# Patient Record
Sex: MALE | Race: WHITE | NOT HISPANIC OR LATINO | URBAN - METROPOLITAN AREA
[De-identification: names, ages, dates, MRNs, and addresses within clinical notes are randomized per-mention and may not be internally consistent; named-entity substitution may affect disease eponyms.]

---

## 2023-11-20 ENCOUNTER — ANCILLARY PROCEDURE (OUTPATIENT)
Dept: RADIOLOGY | Facility: CLINIC | Age: 63
End: 2023-11-20
Payer: MEDICARE

## 2023-11-20 DIAGNOSIS — M47.22 OTHER SPONDYLOSIS WITH RADICULOPATHY, CERVICAL REGION: ICD-10-CM

## 2023-11-20 DIAGNOSIS — M50.220 OTHER CERVICAL DISC DISPLACEMENT, MID-CERVICAL REGION, UNSPECIFIED LEVEL: ICD-10-CM

## 2023-11-20 DIAGNOSIS — M50.320 OTHER CERVICAL DISC DEGENERATION, MID-CERVICAL REGION, UNSPECIFIED LEVEL: ICD-10-CM

## 2023-11-20 PROCEDURE — 72141 MRI NECK SPINE W/O DYE: CPT

## 2024-08-21 ENCOUNTER — HOSPITAL ENCOUNTER (OUTPATIENT)
Dept: RADIOLOGY | Facility: CLINIC | Age: 64
Discharge: HOME | End: 2024-08-21
Payer: MEDICARE

## 2024-08-21 DIAGNOSIS — M75.122 COMPLETE ROTATOR CUFF TEAR OR RUPTURE OF LEFT SHOULDER, NOT SPECIFIED AS TRAUMATIC: ICD-10-CM

## 2024-08-21 PROCEDURE — 73221 MRI JOINT UPR EXTREM W/O DYE: CPT | Mod: LEFT SIDE | Performed by: RADIOLOGY

## 2024-08-21 PROCEDURE — 73221 MRI JOINT UPR EXTREM W/O DYE: CPT | Mod: LT

## 2024-08-28 ENCOUNTER — PRE-ADMISSION TESTING (OUTPATIENT)
Dept: PREADMISSION TESTING | Facility: HOSPITAL | Age: 64
End: 2024-08-28
Payer: MEDICARE

## 2024-08-28 ENCOUNTER — LAB (OUTPATIENT)
Dept: LAB | Facility: LAB | Age: 64
End: 2024-08-28
Payer: MEDICARE

## 2024-08-28 VITALS
WEIGHT: 169.3 LBS | DIASTOLIC BLOOD PRESSURE: 79 MMHG | OXYGEN SATURATION: 98 % | HEART RATE: 89 BPM | TEMPERATURE: 98.1 F | BODY MASS INDEX: 23.7 KG/M2 | RESPIRATION RATE: 18 BRPM | SYSTOLIC BLOOD PRESSURE: 139 MMHG | HEIGHT: 71 IN

## 2024-08-28 DIAGNOSIS — Z01.818 PRE-OP EVALUATION: ICD-10-CM

## 2024-08-28 DIAGNOSIS — Z01.818 PRE-OP EVALUATION: Primary | ICD-10-CM

## 2024-08-28 LAB
ANION GAP SERPL CALC-SCNC: 15 MMOL/L
BASOPHILS # BLD AUTO: 0.05 X10*3/UL (ref 0–0.1)
BASOPHILS NFR BLD AUTO: 0.4 %
BUN SERPL-MCNC: 16 MG/DL (ref 8–25)
CALCIUM SERPL-MCNC: 10 MG/DL (ref 8.5–10.4)
CHLORIDE SERPL-SCNC: 101 MMOL/L (ref 97–107)
CO2 SERPL-SCNC: 25 MMOL/L (ref 24–31)
CREAT SERPL-MCNC: 1.1 MG/DL (ref 0.4–1.6)
EGFRCR SERPLBLD CKD-EPI 2021: 74 ML/MIN/1.73M*2
EOSINOPHIL # BLD AUTO: 0.1 X10*3/UL (ref 0–0.7)
EOSINOPHIL NFR BLD AUTO: 0.8 %
ERYTHROCYTE [DISTWIDTH] IN BLOOD BY AUTOMATED COUNT: 13.1 % (ref 11.5–14.5)
GLUCOSE SERPL-MCNC: 90 MG/DL (ref 65–99)
HCT VFR BLD AUTO: 49.1 % (ref 41–52)
HGB BLD-MCNC: 16.7 G/DL (ref 13.5–17.5)
IMM GRANULOCYTES # BLD AUTO: 0.08 X10*3/UL (ref 0–0.7)
IMM GRANULOCYTES NFR BLD AUTO: 0.6 % (ref 0–0.9)
LYMPHOCYTES # BLD AUTO: 2.19 X10*3/UL (ref 1.2–4.8)
LYMPHOCYTES NFR BLD AUTO: 17.1 %
MCH RBC QN AUTO: 32.1 PG (ref 26–34)
MCHC RBC AUTO-ENTMCNC: 34 G/DL (ref 32–36)
MCV RBC AUTO: 94 FL (ref 80–100)
MONOCYTES # BLD AUTO: 1.64 X10*3/UL (ref 0.1–1)
MONOCYTES NFR BLD AUTO: 12.8 %
NEUTROPHILS # BLD AUTO: 8.76 X10*3/UL (ref 1.2–7.7)
NEUTROPHILS NFR BLD AUTO: 68.3 %
NRBC BLD-RTO: 0 /100 WBCS (ref 0–0)
PLATELET # BLD AUTO: 305 X10*3/UL (ref 150–450)
POTASSIUM SERPL-SCNC: 4.5 MMOL/L (ref 3.4–5.1)
RBC # BLD AUTO: 5.21 X10*6/UL (ref 4.5–5.9)
SODIUM SERPL-SCNC: 141 MMOL/L (ref 133–145)
WBC # BLD AUTO: 12.8 X10*3/UL (ref 4.4–11.3)

## 2024-08-28 PROCEDURE — 80048 BASIC METABOLIC PNL TOTAL CA: CPT

## 2024-08-28 PROCEDURE — 99203 OFFICE O/P NEW LOW 30 MIN: CPT

## 2024-08-28 PROCEDURE — 87081 CULTURE SCREEN ONLY: CPT | Mod: WESLAB

## 2024-08-28 PROCEDURE — 85025 COMPLETE CBC W/AUTO DIFF WBC: CPT

## 2024-08-28 RX ORDER — FLUTICASONE PROPIONATE 50 MCG
1 SPRAY, SUSPENSION (ML) NASAL DAILY
COMMUNITY
Start: 2023-01-03

## 2024-08-28 RX ORDER — IBUPROFEN 800 MG/1
800 TABLET ORAL EVERY 6 HOURS PRN
COMMUNITY
End: 2024-09-04 | Stop reason: HOSPADM

## 2024-08-28 RX ORDER — CHLORHEXIDINE GLUCONATE ORAL RINSE 1.2 MG/ML
SOLUTION DENTAL
Qty: 473 ML | Refills: 0 | Status: SHIPPED | OUTPATIENT
Start: 2024-08-28 | End: 2024-09-04

## 2024-08-28 RX ORDER — HYDROCODONE BITARTRATE AND ACETAMINOPHEN 5; 325 MG/1; MG/1
1 TABLET ORAL EVERY 6 HOURS PRN
COMMUNITY
Start: 2022-12-27 | End: 2024-09-04 | Stop reason: HOSPADM

## 2024-08-28 RX ORDER — ACETAMINOPHEN, DIPHENHYDRAMINE HCL, PHENYLEPHRINE HCL 325; 25; 5 MG/1; MG/1; MG/1
TABLET ORAL NIGHTLY PRN
COMMUNITY

## 2024-08-28 ASSESSMENT — DUKE ACTIVITY SCORE INDEX (DASI)
CAN YOU WALK INDOORS, SUCH AS AROUND YOUR HOUSE: YES
CAN YOU HAVE SEXUAL RELATIONS: YES
DASI METS SCORE: 7.3
CAN YOU WALK A BLOCK OR TWO ON LEVEL GROUND: YES
CAN YOU CLIMB A FLIGHT OF STAIRS OR WALK UP A HILL: YES
CAN YOU DO LIGHT WORK AROUND THE HOUSE LIKE DUSTING OR WASHING DISHES: YES
CAN YOU TAKE CARE OF YOURSELF (EAT, DRESS, BATHE, OR USE TOILET): YES
CAN YOU PARTICIPATE IN MODERATE RECREATIONAL ACTIVITIES LIKE GOLF, BOWLING, DANCING, DOUBLES TENNIS OR THROWING A BASEBALL OR FOOTBALL: NO
CAN YOU RUN A SHORT DISTANCE: YES
CAN YOU PARTICIPATE IN STRENOUS SPORTS LIKE SWIMMING, SINGLES TENNIS, FOOTBALL, BASKETBALL, OR SKIING: NO
CAN YOU DO YARD WORK LIKE RAKING LEAVES, WEEDING OR PUSHING A MOWER: YES
CAN YOU DO HEAVY WORK AROUND THE HOUSE LIKE SCRUBBING FLOORS OR LIFTING AND MOVING HEAVY FURNITURE: NO
CAN YOU DO MODERATE WORK AROUND THE HOUSE LIKE VACUUMING, SWEEPING FLOORS OR CARRYING GROCERIES: YES
TOTAL_SCORE: 36.7

## 2024-08-28 ASSESSMENT — ENCOUNTER SYMPTOMS
ALLERGIC/IMMUNOLOGIC NEGATIVE: 1
CARDIOVASCULAR NEGATIVE: 1
HEMATOLOGIC/LYMPHATIC NEGATIVE: 1
PSYCHIATRIC NEGATIVE: 1
RESPIRATORY NEGATIVE: 1
ENDOCRINE NEGATIVE: 1
CONSTITUTIONAL NEGATIVE: 1
GASTROINTESTINAL NEGATIVE: 1
NUMBNESS: 1
EYES NEGATIVE: 1

## 2024-08-28 ASSESSMENT — PAIN DESCRIPTION - DESCRIPTORS: DESCRIPTORS: ACHING;SHARP

## 2024-08-28 ASSESSMENT — PAIN - FUNCTIONAL ASSESSMENT: PAIN_FUNCTIONAL_ASSESSMENT: 0-10

## 2024-08-28 ASSESSMENT — PAIN SCALES - GENERAL: PAINLEVEL_OUTOF10: 5 - MODERATE PAIN

## 2024-08-28 NOTE — H&P (VIEW-ONLY)
CPM/PAT Evaluation       Name: Brady Mullins (Brady Mullins)  /Age: 1960/64 y.o.     In-Person       Chief Complaint: Left shoulder pain      HPI: Brady Mullins is a 64 year old patient with a history of BPH, hypertension, kidney stones , and hyperlipidemia. He has complaints of left shoulder pain. He states the pain started about two weeks ago after he was chopping wood. He states he woke up the next day and could not lift his lift arm above his shoulder. He states he can lift it above the shoulder today but not without pain. He admits to numbness and tingling that radiates down the left arm. He manages pain at home with ibuprofen. MRI of the left shoulder 24 shows:     IMPRESSION:  1. Full-thickness supraspinatus tendon tear anterior to posterior  tendon with AP gap of 2.1 cm. However, the supraspinatus tendon  demonstrates chronic attenuated appearance. Supraspinatus muscle  demonstrates mild atrophy and feathery appearing interstitial muscle  edema      2. Moderate subscapularis tendinosis      3. Mild long head biceps tendinosis.    He denies fever, chills, nausea, vomiting, chest pain, sob, dizziness, or palpitations. He is scheduled for an arthroscopy left rotator cuff repair.    Past Medical History:   Diagnosis Date    BPH (benign prostatic hyperplasia)     Hyperlipidemia     Hypertension     Kidney stones        Past Surgical History:   Procedure Laterality Date    EXTRACORPOREAL SHOCK WAVE LITHOTRIPSY      MR HEAD ANGIO WO IV CONTRAST  2013    MR HEAD ANGIO WO IV CONTRAST [facility]       Social History     Tobacco Use    Smoking status: Every Day     Current packs/day: 0.50     Average packs/day: 0.5 packs/day for 45.0 years (22.5 ttl pk-yrs)     Types: Cigarettes     Start date: 1979    Smokeless tobacco: Never   Substance Use Topics    Alcohol use: Yes     Comment: OCCASIONALLY     Social History     Substance and Sexual Activity   Drug Use Not Currently          No family history on file.    Allergies   Allergen Reactions    Codeine GI Upset and Nausea And Vomiting     Current Outpatient Medications   Medication Sig Dispense Refill    fluticasone (Flonase Allergy Relief) 50 mcg/actuation nasal spray Administer 1 spray into each nostril once daily.      HYDROcodone-acetaminophen (Norco) 5-325 mg tablet 1 tablet every 6 hours if needed for moderate pain (4 - 6).      ibuprofen 800 mg tablet Take 1 tablet (800 mg) by mouth every 6 hours if needed.      chlorhexidine (Peridex) 0.12 % solution Use as directed 473 mL 0    melatonin 10 mg tablet Take by mouth as needed at bedtime.       No current facility-administered medications for this visit.       Review of Systems   Constitutional: Negative.    HENT: Negative.     Eyes: Negative.    Respiratory: Negative.     Cardiovascular: Negative.    Gastrointestinal: Negative.    Endocrine: Negative.    Genitourinary: Negative.    Musculoskeletal:         Left shoulder pain     Skin: Negative.    Allergic/Immunologic: Negative.    Neurological:  Positive for numbness (tingling down left arm).   Hematological: Negative.    Psychiatric/Behavioral: Negative.                Physical Exam  Vitals reviewed.   Constitutional:       Appearance: Normal appearance.   HENT:      Head: Normocephalic and atraumatic.      Nose: Nose normal.      Mouth/Throat:      Mouth: Mucous membranes are moist.      Pharynx: Oropharynx is clear.   Eyes:      Extraocular Movements: Extraocular movements intact.      Conjunctiva/sclera: Conjunctivae normal.      Pupils: Pupils are equal, round, and reactive to light.   Cardiovascular:      Rate and Rhythm: Normal rate and regular rhythm.      Pulses: Normal pulses.      Heart sounds: Normal heart sounds.   Pulmonary:      Effort: Pulmonary effort is normal.      Breath sounds: Normal breath sounds.   Abdominal:      General: Bowel sounds are normal.      Palpations: Abdomen is soft.   Genitourinary:      "Comments: Assessment referred to physician    Musculoskeletal:      Cervical back: Normal range of motion.      Comments: Pain with left shoulder ROM   Skin:     General: Skin is warm and dry.   Neurological:      General: No focal deficit present.      Mental Status: He is alert and oriented to person, place, and time.   Psychiatric:         Mood and Affect: Mood normal.         Behavior: Behavior normal.         Thought Content: Thought content normal.         Judgment: Judgment normal.          PAT AIRWAY:   Airway:     Mallampati::  III    TM distance::  >3 FB    Neck ROM::  Full  normal      /79   Pulse 89   Temp 36.7 °C (98.1 °F) (Temporal)   Resp 18   Ht 1.803 m (5' 10.98\")   Wt 76.8 kg (169 lb 4.8 oz)   SpO2 98%   BMI 23.62 kg/m²     ASA:2  GLO: 2.8%  RCRI: 0.4%      DASI Risk Score      Flowsheet Row Most Recent Value   DASI SCORE 36.7   METS Score (Will be calculated only when all the questions are answered) 7.3          Caprini DVT Assessment    No data to display       Modified Frailty Index    No data to display       CHADS2 Stroke Risk  Current as of about an hour ago        N/A 3 to 100%: High Risk   2 to < 3%: Medium Risk   0 to < 2%: Low Risk     Last Change: N/A          This score determines the patient's risk of having a stroke if the patient has atrial fibrillation.        This score is not applicable to this patient. Components are not calculated.          Revised Cardiac Risk Index    No data to display       Apfel Simplified Score    No data to display       Risk Analysis Index Results This Encounter    No data found in the last 1 encounters.       Stop Bang Score      Flowsheet Row Most Recent Value   Do you snore loudly? 0   Do you often feel tired or fatigued after your sleep? 0   Has anyone ever observed you stop breathing in your sleep? 0   Do you have or are you being treated for high blood pressure? 1   Recent BMI (Calculated) 23.6   Is BMI greater than 35 kg/m2? 0=No "   Age older than 50 years old? 1=Yes   Is your neck circumference greater than 17 inches (Male) or 16 inches (Female)? 0   Gender - Male 1=Yes   STOP-BANG Total Score 3            Assessment and Plan:     Nontraumatic complete tear of rotator cuff, left : Repair Arthroscopy Rotator Cuff Shoulder Left  BPH: Last PSA 5/9/24 2.91  Hyperlipidemia  Hypertension: Patient states he was taking lisinopril but stopped taking medication after he lost 25 pounds. BP today in /79  5. Daily cigarette smoker: half a pack a day    LABS: CBC, BMP,MRSA collected in PAT    Sharmin Gary APRN-CNP

## 2024-08-28 NOTE — PREPROCEDURE INSTRUCTIONS
Medication List            Accurate as of August 28, 2024  2:44 PM. Always use your most recent med list.                Flonase Allergy Relief 50 mcg/actuation nasal spray  Generic drug: fluticasone  Medication Adjustments for Surgery: Other (Comment)  Notes to patient: CAN USE THE MORNING OF SURGERY IF NEEDED     HYDROcodone-acetaminophen 5-325 mg tablet  Commonly known as: Norco  Medication Adjustments for Surgery: Other (Comment)  Notes to patient: CAN TAKE THE MORNING OF SURGERY IF NEEDED     ibuprofen 800 mg tablet  Medication Adjustments for Surgery: Stop 7 days before surgery     melatonin 10 mg tablet  Medication Adjustments for Surgery: Stop 7 days before surgery                  Preoperative Fasting Guidelines    Why must I stop eating and drinking near surgery time?  With sedation, food or liquid in your stomach can enter your lungs causing serious complications  Increases nausea and vomiting    When do I need to stop eating and drinking before my surgery?  Do not eat any food after midnight the night before your surgery/procedure.  You may have up to 13.5 ounces of clear liquid until TWO hours before your instructed arrival time to the hospital.  This includes water, black tea/coffee, (no milk or cream) apple juice, and electrolyte drinks (Gatorade)  You may chew gum until TWO hours before your surgery/procedure    PAT DISCHARGE INSTRUCTIONS    Please call the Same Day Surgery (SDS) Department of the hospital where your procedure will be performed after 2:00 PM the day before your surgery. If you are scheduled on a Monday, or a Tuesday following a Monday holiday, you will need to call on the last business day prior to your surgery.    [facility]  [address] [place] [address] [phone]  [address], [address] [phone]  [facility]  [facility]  [address] [place] [address]  [phone]    Please let your surgeon know if:      You develop any open sores, shingles, burning or painful urination as these may increase your risk of an infection.   You no longer wish to have the surgery.   Any other personal circumstances change that may lead to the need to cancel or defer this surgery-such as being sick or getting admitted to any hospital within one week of your planned procedure.    Your contact details change, such as a change of address or phone number.    Starting now:     Please DO NOT drink alcohol or smoke for 24 hours before surgery. It is well known that quitting smoking can make a huge difference to your health and recovery from surgery. The longer you abstain from smoking, the better your chances of a healthy recovery. If you need help with quitting, call 4-800QUIT-NOW to be connected to a trained counselor who will discuss the best methods to help you quit.     Before your surgery:    Please stop all supplements 7 days prior to surgery. Or as directed by your surgeon.   Please stop taking NSAID pain medicine such as Advil and Motrin 7 days before surgery.    If you develop any fever, cough, cold, rashes, cuts, scratches, scrapes, urinary symptoms or infection anywhere on your body (including teeth and gums) prior to surgery, please call your surgeon’s office as soon as possible. This may require treatment to reduce the chance of cancellation on the day of surgery.    The day before your surgery:   DIET- Please follow the diet instructions at the top of your packet.   Get a good night’s rest.  Use the special soap for bathing if you have been instructed to use one.    Scheduled surgery times may change and you will be notified if this occurs - please check your personal voicemail for any updates.     On the morning of surgery:   Wear comfortable, loose fitting clothes which open in the front. Please do not wear  moisturizers, creams, lotions, makeup or perfume.    Please bring with you to surgery:   Photo ID and insurance card   Current list of medicines and allergies   Pacemaker/ Defibrillator/Heart stent cards   CPAP machine and mask    Slings/ splints/ crutches   A copy of your complete advanced directive/DHPOA.    Please do NOT bring with you to surgery:   All jewelry and valuables should be left at home.   Prosthetic devices such as contact lenses, hearing aids, dentures, eyelash extensions, hairpins and body piercings must be removed prior to going in to the surgical suite.    After outpatient surgery:   A responsible adult MUST accompany you at the time of discharge and stay with you for 24 hours after your surgery. You may NOT drive yourself home after surgery.    Do not drive, operate machinery, make critical decisions or do activities that require co-ordination or balance until after a night’s sleep.   Do not drink alcoholic beverages for 24 hours.   Instructions for resuming your medications will be provided by your surgeon.    CALL YOUR DOCTOR AFTER SURGERY IF YOU HAVE:     Chills and/or a fever of 101° F or higher.    Redness, swelling, pus or drainage from your surgical wound or a bad smell from the wound.    Lightheadedness, fainting or confusion.    Persistent vomiting (throwing up) and are not able to eat or drink for 12 hours.    Three or more loose, watery bowel movements in 24 hours (diarrhea).   Difficulty or pain while urinating( after non-urological surgery)    Pain and swelling in your legs, especially if it is only on one side.    Difficulty breathing or are breathing faster than normal.    Any new concerning symptoms.      Patient Information: Pre-Operative Infection Prevention Measures     Why did I have my nose, under my arms, and groin swabbed?  The purpose of the swab is to identify Staphylococcus aureus inside your nose or on your skin.  The swab was sent to the laboratory for culture.  A  positive swab/culture for Staphylococcus aureus is called colonization or carriage.      What is Staphylococcus aureus?  Staphylococcus aureus, also known as “staph”, is a germ found on the skin or in the nose of healthy people.  Sometimes Staphylococcus aureus can get into the body and cause an infection.  This can be minor (such as pimples, boils, or other skin problems).  It might also be serious (such as a blood infection, pneumonia, or a surgical site infection).    What is Staphylococcus aureus colonization or carriage?  Colonization or carriage means that a person has the germ but is not sick from it.  These bacteria can be spread on the hands or when breathing or sneezing.    How is Staphylococcus aureus spread?  It is most often spread by close contact with a person or item that carries it.    What happens if my culture is positive for Staphylococcus aureus?  Your doctor/medical team will use this information to guide any antibiotic treatment which may be necessary.  Regardless of the culture results, we will clean the inside of your nose with a betadine swab just before you have your surgery.      Will I get an infection if I have Staphylococcus aureus in my nose or on my skin?  Anyone can get an infection with Staphylococcus aureus.  However, the best way to reduce your risk of infection is to follow the instructions provided to you for the use of your CHG soap and dental rinse.        Patient Information: Oral/Dental Rinse    What is oral/dental rinse?   It is a mouthwash. It is a way of cleaning the mouth with a germ-killing solution before your surgery.  The solution contains chlorhexidine, commonly known as CHG.   It is used inside the mouth to kill a bacteria known as Staphylococcus aureus.  Let your doctor know if you are allergic to Chlorhexidine.    Why do I need to use CHG oral/dental rinse?  The CHG oral/dental rinse helps to kill a bacteria in your mouth known as Staphylococcus aureus.     This  reduces the risk of infection at the surgical site.      Using your CHG oral/dental rinse  STEPS:  Use your CHG oral/dental rinse after you brush your teeth the night before (at bedtime) and the morning of your surgery.  Follow all directions on your prescription label.    Use the cap on the container to measure 15ml   Swish (gargle if you can) the mouthwash in your mouth for at least 30 seconds, (do not swallow) and spit out  After you use your CHG rinse, do not rinse your mouth with water, drink or eat.  Please refer to the prescription label for the appropriate time to resume oral intake      What side effects might I have using the CHG oral/dental rinse?  CHG rinse will stick to plaque on the teeth.  Brush and floss just before use.  Teeth brushing will help avoid staining of plaque during use.      Patient Information: Home Preoperative Antibacterial Shower      What is a home preoperative antibacterial shower?  This shower is a way of cleaning the skin with a germ-killing solution before surgery.  The solution contains chlorhexidine, commonly known as CHG.  CHG is a skin cleanser with germ-killing ability.  Let your doctor know if you are allergic to chlorhexidine.    Why do I need to take a preoperative antibacterial shower?  Skin is not sterile.  It is best to try to make your skin as free of germs as possible before surgery.  Proper cleansing with a germ-killing soap before surgery can lower the number of germs on your skin.  This helps to reduce the risk of infection at the surgical site.  Following the instructions listed below will help you prepare your skin for surgery.      How do I use the solution?  Steps:  Begin using your CHG soap 5 days before your scheduled surgery on ____START WASH 8/31/24___________.    First, wash and rinse your hair using the CHG soap. Keep CHG soap away from ear canals and eyes.  Rinse completely, do not condition.  Hair extensions should be removed.  Wash your face with  your normal soap and rinse.    Apply the CHG solution to a clean wet washcloth.  Turn the water off or move away from the water spray to avoid premature rinsing of the CHG soap as you are applying.   Firmly lather your entire body from the neck down.  Do not use on your face.  Pay special attention to the area(s) where your incision(s) will be located unless they are on your face.  Avoid scrubbing your skin too hard.  The important point is to have the CHG soap sit on your skin for 3 minutes.    When the 3 minutes are up, turn on the water and rinse the CHG solution off your body completely.   DO NOT wash with regular soap after you have used the CHG soap solution  Pat yourself dry with a clean, freshly-laundered towel.  DO NOT apply powders, deodorants, or lotions.  Dress in clean, freshly laundered nightclothes.    Be sure to sleep with clean, freshly laundered sheets.  Be aware that CHG will cause stains on fabrics; if you wash them with bleach after use.  Rinse your washcloth and other linens that have contact with CHG completely.  Use only non-chlorine detergents to launder the items used.   The morning of surgery is the fifth day.  Repeat the above steps and dress in clean comfortable clothing     Whom should I contact if I have any questions regarding the use of CHG soap?  Call the [facility] at [phone] if you have any questions.

## 2024-08-30 LAB — STAPHYLOCOCCUS SPEC CULT: NORMAL

## 2024-09-04 ENCOUNTER — PHARMACY VISIT (OUTPATIENT)
Dept: PHARMACY | Facility: CLINIC | Age: 64
End: 2024-09-04
Payer: MEDICARE

## 2024-09-04 ENCOUNTER — HOSPITAL ENCOUNTER (OUTPATIENT)
Facility: HOSPITAL | Age: 64
Setting detail: OUTPATIENT SURGERY
Discharge: HOME | End: 2024-09-04
Attending: ORTHOPAEDIC SURGERY | Admitting: ORTHOPAEDIC SURGERY
Payer: MEDICARE

## 2024-09-04 ENCOUNTER — ANESTHESIA (OUTPATIENT)
Dept: OPERATING ROOM | Facility: HOSPITAL | Age: 64
End: 2024-09-04
Payer: MEDICARE

## 2024-09-04 ENCOUNTER — ANESTHESIA EVENT (OUTPATIENT)
Dept: OPERATING ROOM | Facility: HOSPITAL | Age: 64
End: 2024-09-04
Payer: MEDICARE

## 2024-09-04 VITALS
TEMPERATURE: 97 F | HEART RATE: 71 BPM | DIASTOLIC BLOOD PRESSURE: 79 MMHG | SYSTOLIC BLOOD PRESSURE: 160 MMHG | RESPIRATION RATE: 16 BRPM | OXYGEN SATURATION: 96 %

## 2024-09-04 DIAGNOSIS — M75.122 COMPLETE TEAR OF LEFT ROTATOR CUFF, UNSPECIFIED WHETHER TRAUMATIC: Primary | ICD-10-CM

## 2024-09-04 PROBLEM — N40.0 BPH (BENIGN PROSTATIC HYPERPLASIA): Status: ACTIVE | Noted: 2024-09-04

## 2024-09-04 PROBLEM — E78.5 HYPERLIPIDEMIA: Status: ACTIVE | Noted: 2024-09-04

## 2024-09-04 PROBLEM — I10 HTN (HYPERTENSION): Status: ACTIVE | Noted: 2024-09-04

## 2024-09-04 PROCEDURE — 3700000002 HC GENERAL ANESTHESIA TIME - EACH INCREMENTAL 1 MINUTE: Performed by: ORTHOPAEDIC SURGERY

## 2024-09-04 PROCEDURE — 2500000004 HC RX 250 GENERAL PHARMACY W/ HCPCS (ALT 636 FOR OP/ED): Performed by: NURSE ANESTHETIST, CERTIFIED REGISTERED

## 2024-09-04 PROCEDURE — 7100000010 HC PHASE TWO TIME - EACH INCREMENTAL 1 MINUTE: Performed by: ORTHOPAEDIC SURGERY

## 2024-09-04 PROCEDURE — 2740000001 HC OR 274 NO HCPCS: Performed by: ORTHOPAEDIC SURGERY

## 2024-09-04 PROCEDURE — 3600000009 HC OR TIME - EACH INCREMENTAL 1 MINUTE - PROCEDURE LEVEL FOUR: Performed by: ORTHOPAEDIC SURGERY

## 2024-09-04 PROCEDURE — 3600000004 HC OR TIME - INITIAL BASE CHARGE - PROCEDURE LEVEL FOUR: Performed by: ORTHOPAEDIC SURGERY

## 2024-09-04 PROCEDURE — RXMED WILLOW AMBULATORY MEDICATION CHARGE

## 2024-09-04 PROCEDURE — 3700000001 HC GENERAL ANESTHESIA TIME - INITIAL BASE CHARGE: Performed by: ORTHOPAEDIC SURGERY

## 2024-09-04 PROCEDURE — C1713 ANCHOR/SCREW BN/BN,TIS/BN: HCPCS | Performed by: ORTHOPAEDIC SURGERY

## 2024-09-04 PROCEDURE — 7100000009 HC PHASE TWO TIME - INITIAL BASE CHARGE: Performed by: ORTHOPAEDIC SURGERY

## 2024-09-04 PROCEDURE — 2720000007 HC OR 272 NO HCPCS: Performed by: ORTHOPAEDIC SURGERY

## 2024-09-04 PROCEDURE — 2780000003 HC OR 278 NO HCPCS: Performed by: ORTHOPAEDIC SURGERY

## 2024-09-04 PROCEDURE — 7100000002 HC RECOVERY ROOM TIME - EACH INCREMENTAL 1 MINUTE: Performed by: ORTHOPAEDIC SURGERY

## 2024-09-04 PROCEDURE — L3670 SO ACRO/CLAV CAN WEB PRE OTS: HCPCS | Performed by: ORTHOPAEDIC SURGERY

## 2024-09-04 PROCEDURE — 7100000001 HC RECOVERY ROOM TIME - INITIAL BASE CHARGE: Performed by: ORTHOPAEDIC SURGERY

## 2024-09-04 PROCEDURE — 2500000004 HC RX 250 GENERAL PHARMACY W/ HCPCS (ALT 636 FOR OP/ED): Performed by: STUDENT IN AN ORGANIZED HEALTH CARE EDUCATION/TRAINING PROGRAM

## 2024-09-04 PROCEDURE — 2500000005 HC RX 250 GENERAL PHARMACY W/O HCPCS: Performed by: NURSE ANESTHETIST, CERTIFIED REGISTERED

## 2024-09-04 PROCEDURE — 2500000004 HC RX 250 GENERAL PHARMACY W/ HCPCS (ALT 636 FOR OP/ED): Performed by: ORTHOPAEDIC SURGERY

## 2024-09-04 DEVICE — ANCHOR, BIOCOMPOSITE SWIVELOCK C, DBL LOADED, 4.75 X 22: Type: IMPLANTABLE DEVICE | Site: SHOULDER | Status: FUNCTIONAL

## 2024-09-04 RX ORDER — OXYCODONE AND ACETAMINOPHEN 5; 325 MG/1; MG/1
1 TABLET ORAL EVERY 6 HOURS
Status: CANCELLED | OUTPATIENT
Start: 2024-09-04

## 2024-09-04 RX ORDER — FENTANYL CITRATE 50 UG/ML
50 INJECTION, SOLUTION INTRAMUSCULAR; INTRAVENOUS ONCE
Status: COMPLETED | OUTPATIENT
Start: 2024-09-04 | End: 2024-09-04

## 2024-09-04 RX ORDER — LABETALOL HYDROCHLORIDE 5 MG/ML
5 INJECTION, SOLUTION INTRAVENOUS EVERY 5 MIN PRN
Status: DISCONTINUED | OUTPATIENT
Start: 2024-09-04 | End: 2024-09-04 | Stop reason: HOSPADM

## 2024-09-04 RX ORDER — IBUPROFEN 600 MG/1
600 TABLET ORAL 4 TIMES DAILY PRN
Qty: 30 TABLET | Refills: 0 | Status: SHIPPED | OUTPATIENT
Start: 2024-09-04

## 2024-09-04 RX ORDER — ONDANSETRON HYDROCHLORIDE 2 MG/ML
4 INJECTION, SOLUTION INTRAVENOUS ONCE AS NEEDED
Status: DISCONTINUED | OUTPATIENT
Start: 2024-09-04 | End: 2024-09-04 | Stop reason: HOSPADM

## 2024-09-04 RX ORDER — ALBUTEROL SULFATE 0.83 MG/ML
2.5 SOLUTION RESPIRATORY (INHALATION) EVERY 30 MIN PRN
Status: DISCONTINUED | OUTPATIENT
Start: 2024-09-04 | End: 2024-09-04 | Stop reason: HOSPADM

## 2024-09-04 RX ORDER — MIDAZOLAM HYDROCHLORIDE 1 MG/ML
2 INJECTION, SOLUTION INTRAMUSCULAR; INTRAVENOUS ONCE
Status: COMPLETED | OUTPATIENT
Start: 2024-09-04 | End: 2024-09-04

## 2024-09-04 RX ORDER — LIDOCAINE HYDROCHLORIDE 10 MG/ML
INJECTION, SOLUTION EPIDURAL; INFILTRATION; INTRACAUDAL; PERINEURAL AS NEEDED
Status: DISCONTINUED | OUTPATIENT
Start: 2024-09-04 | End: 2024-09-04

## 2024-09-04 RX ORDER — CEFAZOLIN SODIUM 2 G/100ML
2 INJECTION, SOLUTION INTRAVENOUS ONCE
Status: DISCONTINUED | OUTPATIENT
Start: 2024-09-04 | End: 2024-09-04 | Stop reason: HOSPADM

## 2024-09-04 RX ORDER — SODIUM CHLORIDE, SODIUM LACTATE, POTASSIUM CHLORIDE, CALCIUM CHLORIDE 600; 310; 30; 20 MG/100ML; MG/100ML; MG/100ML; MG/100ML
20 INJECTION, SOLUTION INTRAVENOUS CONTINUOUS
Status: DISCONTINUED | OUTPATIENT
Start: 2024-09-04 | End: 2024-09-04 | Stop reason: HOSPADM

## 2024-09-04 RX ORDER — ONDANSETRON HYDROCHLORIDE 2 MG/ML
INJECTION, SOLUTION INTRAVENOUS AS NEEDED
Status: DISCONTINUED | OUTPATIENT
Start: 2024-09-04 | End: 2024-09-04

## 2024-09-04 RX ORDER — FENTANYL CITRATE 50 UG/ML
INJECTION, SOLUTION INTRAMUSCULAR; INTRAVENOUS AS NEEDED
Status: DISCONTINUED | OUTPATIENT
Start: 2024-09-04 | End: 2024-09-04

## 2024-09-04 RX ORDER — FENTANYL CITRATE 50 UG/ML
25 INJECTION, SOLUTION INTRAMUSCULAR; INTRAVENOUS EVERY 5 MIN PRN
Status: DISCONTINUED | OUTPATIENT
Start: 2024-09-04 | End: 2024-09-04 | Stop reason: HOSPADM

## 2024-09-04 RX ORDER — SODIUM CHLORIDE, SODIUM LACTATE, POTASSIUM CHLORIDE, CALCIUM CHLORIDE 600; 310; 30; 20 MG/100ML; MG/100ML; MG/100ML; MG/100ML
40 INJECTION, SOLUTION INTRAVENOUS CONTINUOUS
Status: DISCONTINUED | OUTPATIENT
Start: 2024-09-04 | End: 2024-09-04 | Stop reason: HOSPADM

## 2024-09-04 RX ORDER — FENTANYL CITRATE 50 UG/ML
50 INJECTION, SOLUTION INTRAMUSCULAR; INTRAVENOUS EVERY 5 MIN PRN
Status: DISCONTINUED | OUTPATIENT
Start: 2024-09-04 | End: 2024-09-04 | Stop reason: HOSPADM

## 2024-09-04 RX ORDER — MEPERIDINE HYDROCHLORIDE 25 MG/ML
12.5 INJECTION INTRAMUSCULAR; INTRAVENOUS; SUBCUTANEOUS EVERY 10 MIN PRN
Status: DISCONTINUED | OUTPATIENT
Start: 2024-09-04 | End: 2024-09-04 | Stop reason: HOSPADM

## 2024-09-04 RX ORDER — IPRATROPIUM BROMIDE 0.5 MG/2.5ML
500 SOLUTION RESPIRATORY (INHALATION) EVERY 30 MIN PRN
Status: DISCONTINUED | OUTPATIENT
Start: 2024-09-04 | End: 2024-09-04 | Stop reason: HOSPADM

## 2024-09-04 RX ORDER — ROCURONIUM BROMIDE 10 MG/ML
INJECTION, SOLUTION INTRAVENOUS AS NEEDED
Status: DISCONTINUED | OUTPATIENT
Start: 2024-09-04 | End: 2024-09-04

## 2024-09-04 RX ORDER — OXYCODONE AND ACETAMINOPHEN 5; 325 MG/1; MG/1
1 TABLET ORAL EVERY 6 HOURS PRN
Qty: 20 TABLET | Refills: 0 | Status: SHIPPED | OUTPATIENT
Start: 2024-09-04

## 2024-09-04 RX ORDER — PROPOFOL 10 MG/ML
INJECTION, EMULSION INTRAVENOUS AS NEEDED
Status: DISCONTINUED | OUTPATIENT
Start: 2024-09-04 | End: 2024-09-04

## 2024-09-04 RX ORDER — CEFAZOLIN 1 G/1
INJECTION, POWDER, FOR SOLUTION INTRAVENOUS AS NEEDED
Status: DISCONTINUED | OUTPATIENT
Start: 2024-09-04 | End: 2024-09-04

## 2024-09-04 SDOH — HEALTH STABILITY: MENTAL HEALTH: CURRENT SMOKER: 0

## 2024-09-04 ASSESSMENT — PAIN - FUNCTIONAL ASSESSMENT
PAIN_FUNCTIONAL_ASSESSMENT: FLACC (FACE, LEGS, ACTIVITY, CRY, CONSOLABILITY)
PAIN_FUNCTIONAL_ASSESSMENT: 0-10
PAIN_FUNCTIONAL_ASSESSMENT: FLACC (FACE, LEGS, ACTIVITY, CRY, CONSOLABILITY)
PAIN_FUNCTIONAL_ASSESSMENT: 0-10

## 2024-09-04 ASSESSMENT — PAIN SCALES - GENERAL
PAINLEVEL_OUTOF10: 0 - NO PAIN

## 2024-09-04 ASSESSMENT — COLUMBIA-SUICIDE SEVERITY RATING SCALE - C-SSRS
2. HAVE YOU ACTUALLY HAD ANY THOUGHTS OF KILLING YOURSELF?: NO
6. HAVE YOU EVER DONE ANYTHING, STARTED TO DO ANYTHING, OR PREPARED TO DO ANYTHING TO END YOUR LIFE?: NO
1. IN THE PAST MONTH, HAVE YOU WISHED YOU WERE DEAD OR WISHED YOU COULD GO TO SLEEP AND NOT WAKE UP?: NO

## 2024-09-04 NOTE — PERIOPERATIVE NURSING NOTE
Patient received to Pacu Wright # 6 from OR. Anesthesia at bedside. Report received. Initial assessment complete. VSS on Cardiac Monitor.  Patient appears comfortable at present. No Signs or Symptoms of Pain Noted.

## 2024-09-04 NOTE — OP NOTE
Repair Arthroscopy Rotator Cuff Shoulder (L) Operative Note     Date: 2024  OR Location: [facility] OR    Name: Brady Mullins : 1960, Age: 64 y.o., MRN: [id number], Sex: male    Diagnosis  Pre-op Diagnosis      * Nontraumatic complete tear of rotator cuff, left [M75.122] Post-op Diagnosis     * Nontraumatic complete tear of rotator cuff, left [M75.122]     Procedures  Repair Arthroscopy Rotator Cuff Shoulder  11185 - [place] SURGICAL ARTHROSCOPY SHOULDER W/ROTATOR CUFF RPR      Surgeons      * Ashu Oliver - Primary    Resident/Fellow/Other Assistant:  Surgeons and Role:  * No surgeons found with a matching role *  Kurt    Procedure Summary  Anesthesia: Regional, General  ASA: II  Anesthesia Staff: Anesthesiologist: Jesus Zuñiga DO  CRNA: DEMOND Jean  SRNA: Blayne Borjas  Estimated Blood Loss: 25mL  Intra-op Medications: Administrations occurring from 0745 to 1015 on 24:  * No intraprocedure medications in log *           Anesthesia Record               Intraprocedure I/O Totals       None           Specimen: No specimens collected     Staff:   Circulator: Sapna  Circulator: Marlin Joiner Person: Glenna Joiner Person: Cele Escobar Circulator: Chel         Drains and/or Catheters: * None in log *    Tourniquet Times:         Implants:Arthrex 4.75 swivelock x 1  Implants       Type Name Action Serial No.      Implant ANCHOR, BIOCOMPOSITE SWIVELOCK C, DBL LOADED, 4.75 X 22 - [id number] Implanted               Findings: Approximately 1 cm anterior rotator cuff tear    Indications: Brady Mullins is an 64 y.o. male who is having surgery for LEFT SHOULDER ROTATOR CUFF TEAR.  64-year-old male who had felt a pop and was found to have a left shoulder rotator cuff tear.  It was discussed with the patient would likely benefit from a left shoulder arthroscopy and repair of said rotator cuff.  Risks and benefits of this were further discussed including but not limited to  bleeding infection damage to blood vessels nerves veins tendons residual shoulder pain stiffness loss of range of motion failure of repair and need for repeat surgical procedures.  Patient agreeable and wished to proceed.    The patient was seen in the preoperative area. The risks, benefits, complications, treatment options, non-operative alternatives, expected recovery and outcomes were discussed with the patient. The possibilities of reaction to medication, pulmonary aspiration, injury to surrounding structures, bleeding, recurrent infection, the need for additional procedures, failure to diagnose a condition, and creating a complication requiring transfusion or operation were discussed with the patient. The patient concurred with the proposed plan, giving informed consent.  The site of surgery was properly noted/marked if necessary per policy. The patient has been actively warmed in preoperative area. Preoperative antibiotics have been ordered and given within 1 hours of incision. Venous thrombosis prophylaxis are not indicated.    Procedure Details: After identification of the patient and marking of the correct operative site patient was taken to the operating room.  Regional anesthetic had been administered SCDs applied on bile extremities and perioperative antibiotics were administered.  General anesthesia was administered patient was then positioned in the beachchair position.  Bony prominences were padded and the right shoulder and arm were then prepped and draped in the usual sterile fashion.  Standard anterior posterior lateral anterior lateral and anterior portal sites were marked on the skin.  Incision made for the posterior portal blunt trocar and scope were then introduced into the shoulder we verified the position of the shoulder and then pushed the scope against the anterior rotator interval to make an inside out portal for the anterior portal.  Skin incision was then made anteriorly and a purple  plastic trocar was introduced into the shoulder.  Shaver is introduced anteriorly there is noted to be moderate bursitis within the shoulder which is debrided using the rotary shaver.  Biceps tendon anchor appear to be intact without significant synovitis.  There was some mild fraying of the biceps tendon itself which was debrided with a rotary shaver.  There was noted to be a small tear full-thickness tear of the anterior portion of the rotator cuff and the undersurface and bursitis in the on the rotator cuff was debrided with a rotary shaver and thermal wand.  Subscapularis appear to be intact with some mild fraying which was debrided as well.  Glenohumeral joint space appeared to be intact without any significant degeneration.  We then next placed the scope into the subacromial space from his posterior portal made our posterior lateral portal and introduced a shaver.  There is noted to be moderate bursitis within the subacromial space which were debrided with a combination of rotary shaver and thermal wand.  Acromion undersurface debrided with a rotary shaver.  After debridement of bursitis the rotator cuff tear was identified in the anterior portion of the cuff.  Bony surfaces repaired with a combination of the rotary shaver and small rasp once we were down to healthy appearing bleeding bony surfaces.  Using the scorpion we then placed a fiber tape through the rotator cuff in a horizontal mattress type fashion.  This was threaded onto a 475 swivel lock.  We then punched for and placed a 4.75 swivel lock to repair our cuff tear down.  Fiber tape was trimmed we then inspected with a probe to verify adequate stout repair and were satisfied.  The sutures threaded through the anchor were removed.  Arthroscopic equipment removed from the shoulder.  Portal sites were then closed with 4-0 Monocryl and glue sterile dressing of Telfa and Tegaderms was applied.  Patient awake from anesthesia and taken to the operating to  recover without complication.  Is present for the entirety of this procedure   Complications:  None; patient tolerated the procedure well.    Disposition: PACU - hemodynamically stable.  Condition: stable         Additional Details: none    Attending Attestation:     Ashu Oliver  Phone Number: [phone]

## 2024-09-04 NOTE — ANESTHESIA POSTPROCEDURE EVALUATION
Patient: Brady Mullins    Procedure Summary       Date: 09/04/24 Room / Location: [facility] OR 05 / Virtual [facility] OR    Anesthesia Start: 0757 Anesthesia Stop: 0941    Procedure: Repair Arthroscopy Rotator Cuff Shoulder (Left: Shoulder) Diagnosis:       Nontraumatic complete tear of rotator cuff, left      (LEFT SHOULDER ROTATOR CUFF TEAR)    Surgeons: Ashu Oliver MD Responsible Provider: Jesus Zuñiga DO    Anesthesia Type: general, regional ASA Status: 2            Anesthesia Type: general, regional    Vitals Value Taken Time   /66 09/04/24 0957   Temp 36 09/04/24 1000   Pulse 65 09/04/24 1000   Resp 19 09/04/24 1000   SpO2 95 % 09/04/24 1000   Vitals shown include unfiled device data.    Anesthesia Post Evaluation    Patient location during evaluation: bedside  Patient participation: complete - patient participated  Level of consciousness: awake and alert  Pain management: adequate  Multimodal analgesia pain management approach  Airway patency: patent  Two or more strategies used to mitigate risk of obstructive sleep apnea  Cardiovascular status: acceptable  Respiratory status: acceptable  Hydration status: acceptable  Postoperative Nausea and Vomiting: none        No notable events documented.

## 2024-09-04 NOTE — ANESTHESIA PROCEDURE NOTES
Peripheral Block    Patient location during procedure: pre-op  Start time: 9/4/2024 7:33 AM  End time: 9/4/2024 7:35 AM  Reason for block: at surgeon's request and post-op pain management  Staffing  Performed: attending   Authorized by: Jesus Zuñiga DO    Performed by: Jesus Zuñiga DO  Preanesthetic Checklist  Completed: patient identified, IV checked, site marked, risks and benefits discussed, surgical consent, monitors and equipment checked, pre-op evaluation and timeout performed   Timeout performed at: 9/4/2024 7:28 AM  Peripheral Block  Patient position: sitting  Prep: ChloraPrep  Patient monitoring: heart rate, cardiac monitor and continuous pulse ox  Block type: interscalene  Laterality: left  Injection technique: single-shot  Guidance: ultrasound guided  Local infiltration: ropivacaine  Infiltration strength: 0.5 %  Dose: 20 mL  Needle  Needle gauge: 22 G  Needle length: 5 cm  Needle localization: ultrasound guidance  Assessment  Injection assessment: negative aspiration for heme, no paresthesia on injection, incremental injection and local visualized surrounding nerve on ultrasound  Paresthesia pain: none  Heart rate change: no  Slow fractionated injection: yes  Additional Notes  With 4mg Decadron

## 2024-09-04 NOTE — PRE-PROCEDURE NOTE
ADMITTED TO SDS. ALERT AND ORIENTED. GAIT STEADY. NO NOTED RESP. DISTRESS. ORIENTED TO THE SURROUNDINGS. VSS. IV STARTED AND FLUIDS STARTED AT [facility]. ANESTHESIA IN TO SEE PT. MD IN FOR CONSENT. CALL BELL IN REACH. READY FOR PROCEDURE. NO NOTED DISTRESS.  0710 UP TO VOID.

## 2024-09-04 NOTE — DISCHARGE INSTRUCTIONS
Discharge Instructions for Peripheral Nerve Block for Upper Extremity  Notify the anesthesiologist on call at [phone]:  If you have any questions or problems regarding your nerve block, go to the nearest emergency room or call 911. If you have coughing, chest pain, and/or shortness of breath unrelieved by sitting up. This may be a serious emergency.     Activity:  Your shoulder, arm, and hand will be numb and weak after surgery.   You will not be able to move your arm until the medicine wears off.  Protect the position of your arm, especially the elbow. Keep your arm in your sling resting on the two pillows while you are awake or sleeping.  Avoid putting your arm or objects that are extremely hot or cold. Your ability to feel hot and cold will be decreased until the numbing medicine wears off.    Pain Medicine:  The numbing effect of the nerve block can last:  Marcaine 16-24 hours  Exparel 28-72 hours  Take your pain medicine the night of surgery before going to sleep or before you feel the numbing medicine starting to wear off.   Take your pain medicine as specified during the day and night even if you do not feel pain.     Additional Instructions:  Have a responsible adult remain with you to assist you at home after surgery. Remember that you will not be able to use your surgical arm to perform activities such as dressing, washing, and eating.   You may experience numbness on the side of your face, hoarseness or congestion or have a red eye and drooping eyelid on the side of surgery. These side effects will decrease as the anesthesia in the shoulder wears off.   You may feel discomfort when breathing after surgery and during recovery. This is caused by the numbness of the nerve the supplies the diaphragm (breathing muscle) on the side of the surgery. You will feel better if you rest and sleep with your head and upper body at a 45 degree angle by using 2-3 pillow or be sitting in a recliner chair. This  discomfort decreases as the anesthesia in the shoulder wears off.    Discharge Instructions for Peripheral Nerve Block for Upper Extremity  Notify the anesthesiologist on call at [phone]:  If you have any questions or problems regarding your nerve block, go to the nearest emergency room or call 911. If you have coughing, chest pain, and/or shortness of breath unrelieved by sitting up. This may be a serious emergency.     Activity:  Your shoulder, arm, and hand will be numb and weak after surgery.   You will not be able to move your arm until the medicine wears off.  Protect the position of your arm, especially the elbow. Keep your arm in your sling resting on the two pillows while you are awake or sleeping.  Avoid putting your arm or objects that are extremely hot or cold. Your ability to feel hot and cold will be decreased until the numbing medicine wears off.    Pain Medicine:  The numbing effect of the nerve block can last:  Marcaine 16-24 hours  Exparel 28-72 hours  Take your pain medicine the night of surgery before going to sleep or before you feel the numbing medicine starting to wear off.   Take your pain medicine as specified during the day and night even if you do not feel pain.     Additional Instructions:  Have a responsible adult remain with you to assist you at home after surgery. Remember that you will not be able to use your surgical arm to perform activities such as dressing, washing, and eating.   You may experience numbness on the side of your face, hoarseness or congestion or have a red eye and drooping eyelid on the side of surgery. These side effects will decrease as the anesthesia in the shoulder wears off.   You may feel discomfort when breathing after surgery and during recovery. This is caused by the numbness of the nerve the supplies the diaphragm (breathing muscle) on the side of the surgery. You will feel better if you rest and sleep with your head and upper body at a 45 degree  angle by using 2-3 pillow or be sitting in a recliner chair. This discomfort decreases as the anesthesia in the shoulder wears off.    PAT DISCHARGE INSTRUCTIONS    Please call the Same Day Surgery (SDS) Department of the hospital where your procedure will be performed after 2:00 PM the day before your surgery. If you are scheduled on a Monday, or a Tuesday following a Monday holiday, you will need to call on the last business day prior to your surgery.    [facility]  [address] [place] [address] [phone]  [facility]  [address] [place] [address], [address] [phone]  [facility]  [address]  [place] [id number] [phone]    Please let your surgeon know if:      You develop any open sores, shingles, burning or painful urination as these may increase your risk of an infection.   You no longer wish to have the surgery.   Any other personal circumstances change that may lead to the need to cancel or defer this surgery-such as being sick or getting admitted to any hospital within one week of your planned procedure.    Your contact details change, such as a change of address or phone number.    Starting now:     Please DO NOT drink alcohol or smoke for 24 hours before surgery. It is well known that quitting smoking can make a huge difference to your health and recovery from surgery. The longer you abstain from smoking, the better your chances of a healthy recovery. If you need help with quitting, call 7-152-QUIT-NOW to be connected to a trained counselor who will discuss the best methods to help you quit.     Before your surgery:    Please stop all supplements 7 days prior to surgery. Or as directed by your surgeon.   Please stop taking NSAID pain medicine such as Advil and Motrin 7 days before surgery.    If you develop any fever, cough, cold, rashes,  cuts, scratches, scrapes, urinary symptoms or infection anywhere on your body (including teeth and gums) prior to surgery, please call your surgeon’s office as soon as possible. This may require treatment to reduce the chance of cancellation on the day of surgery.    The day before your surgery:   DIET- Please follow the diet instructions at the top of your packet.   Get a good night’s rest.  Use the special soap for bathing if you have been instructed to use one.    Scheduled surgery times may change and you will be notified if this occurs - please check your personal voicemail for any updates.     On the morning of surgery:   Wear comfortable, loose fitting clothes which open in the front. Please do not wear moisturizers, creams, lotions, makeup or perfume.    Please bring with you to surgery:   Photo ID and insurance card   Current list of medicines and allergies   Pacemaker/ Defibrillator/Heart stent cards   CPAP machine and mask    Slings/ splints/ crutches   A copy of your complete advanced directive/DHPOA.    Please do NOT bring with you to surgery:   All jewelry and valuables should be left at home.   Prosthetic devices such as contact lenses, hearing aids, dentures, eyelash extensions, hairpins and body piercings must be removed prior to going in to the surgical suite.    After outpatient surgery:   A responsible adult MUST accompany you at the time of discharge and stay with you for 24 hours after your surgery. You may NOT drive yourself home after surgery.    Do not drive, operate machinery, make critical decisions or do activities that require co-ordination or balance until after a night’s sleep.   Do not drink alcoholic beverages for 24 hours.   Instructions for resuming your medications will be provided by your surgeon.    CALL YOUR DOCTOR AFTER SURGERY IF YOU HAVE:     Chills and/or a fever of 101° F or higher.    Redness, swelling, pus or drainage from your surgical wound or a bad smell from the  wound.    Lightheadedness, fainting or confusion.    Persistent vomiting (throwing up) and are not able to eat or drink for 12 hours.    Three or more loose, watery bowel movements in 24 hours (diarrhea).   Difficulty or pain while urinating( after non-urological surgery)    Pain and swelling in your legs, especially if it is only on one side.    Difficulty breathing or are breathing faster than normal.    Any new concerning symptoms.PAT DISCHARGE INSTRUCTIONS    Please call the Same Day Surgery (SDS) Department of the hospital where your procedure will be performed after 2:00 PM the day before your surgery. If you are scheduled on a Monday, or a Tuesday following a Monday holiday, you will need to call on the last business day prior to your surgery.    [facility]  [address] [place] [address] [phone]  [facility]  [address] [place] [address], [address] [phone]  [facility]  [address] [place] [address].  [place] [id number] [phone]    Please let your surgeon know if:      You develop any open sores, shingles, burning or painful urination as these may increase your risk of an infection.   You no longer wish to have the surgery.   Any other personal circumstances change that may lead to the need to cancel or defer this surgery-such as being sick or getting admitted to any hospital within one week of your planned procedure.    Your contact details change, such as a change of address or phone number.    Starting now:     Please DO NOT drink alcohol or smoke for 24 hours before surgery. It is well known that quitting smoking can make a huge difference to your health and recovery from surgery. The longer you abstain from smoking, the better your chances of a healthy recovery. If you need help with quitting, call 8-191-QUIT-NOW to be connected to a trained counselor  who will discuss the best methods to help you quit.     Before your surgery:    Please stop all supplements 7 days prior to surgery. Or as directed by your surgeon.   Please stop taking NSAID pain medicine such as Advil and Motrin 7 days before surgery.    If you develop any fever, cough, cold, rashes, cuts, scratches, scrapes, urinary symptoms or infection anywhere on your body (including teeth and gums) prior to surgery, please call your surgeon’s office as soon as possible. This may require treatment to reduce the chance of cancellation on the day of surgery.    The day before your surgery:   DIET- Please follow the diet instructions at the top of your packet.   Get a good night’s rest.  Use the special soap for bathing if you have been instructed to use one.    Scheduled surgery times may change and you will be notified if this occurs - please check your personal voicemail for any updates.     On the morning of surgery:   Wear comfortable, loose fitting clothes which open in the front. Please do not wear moisturizers, creams, lotions, makeup or perfume.    Please bring with you to surgery:   Photo ID and insurance card   Current list of medicines and allergies   Pacemaker/ Defibrillator/Heart stent cards   CPAP machine and mask    Slings/ splints/ crutches   A copy of your complete advanced directive/DHPOA.    Please do NOT bring with you to surgery:   All jewelry and valuables should be left at home.   Prosthetic devices such as contact lenses, hearing aids, dentures, eyelash extensions, hairpins and body piercings must be removed prior to going in to the surgical suite.    After outpatient surgery:   A responsible adult MUST accompany you at the time of discharge and stay with you for 24 hours after your surgery. You may NOT drive yourself home after surgery.    Do not drive, operate machinery, make critical decisions or do activities that require co-ordination or balance until after a night’s sleep.   Do not  drink alcoholic beverages for 24 hours.   Instructions for resuming your medications will be provided by your surgeon.    CALL YOUR DOCTOR AFTER SURGERY IF YOU HAVE:     Chills and/or a fever of 101° F or higher.    Redness, swelling, pus or drainage from your surgical wound or a bad smell from the wound.    Lightheadedness, fainting or confusion.    Persistent vomiting (throwing up) and are not able to eat or drink for 12 hours.    Three or more loose, watery bowel movements in 24 hours (diarrhea).   Difficulty or pain while urinating( after non-urological surgery)    Pain and swelling in your legs, especially if it is only on one side.    Difficulty breathing or are breathing faster than normal.    Any new concerning symptoms.      [facility]  (in partnership with [facility])  Dr. Koffi Jones  [place] Office: [address] ([facility] [place] [address] (Phone: [phone])  [place] Office: [address] ([facility]), [place] [address] (Phone: [phone])  [email or web address] CenterService Route    Postoperative Instructions for Arthroscopic Shoulder Surgery  Your recovery after shoulder surgery entails controlling swelling and discomfort and maintaining elbow, wrist, and hand function. The following instructions are intended as a guide to help you achieve these goals until your first postoperative visit. However, no patient or surgery is the same, thus these are general guidelines and  will tell you specifics related to your surgery.    A. Comfort  Although arthroscopy uses only a few tiny incisions around the shoulder joint, swelling and discomfort can be present. To minimize discomfort, please do the following:  Ice: Ice can help control swelling and discomfort. Place crushed ice (or a frozen bag of peas) in a plastic bag, then wrap the bag with a small towel to protect your skin. Place the ice  over your shoulder for no more than 30 minutes each hour.  Pain Medication: You will be given a prescription for pain medication, but only take it as necessary. Avoid any NSAIDS (Motrin, Advil, Aleve, Ibuprofen) until approved by Dr. Jones. Avoid alcohol if you are taking pain medication.   The local anesthetic from a nerve block may keep your entire arm numb for many hours. After the nerve block resolves, the pain will require prescription medication. Many patients find that lying down adds to their discomfort. Sleeping in a recliner, or propped up in bed often works best. A pillow placed behind your elbow may also help.   Sling: A sling has been provided for your comfort. Use the sling until directed to stop using by Dr. Jones.       B. Activities  Sling Use: You will be immobilized with a sling for approximately 4-6 weeks, depending on your surgery. Dr. Jones will let you know how long it will be during your first postoperative visit. You may gently remove the sling for dressing, hygiene, and elbow/wrist/hand exercises.   Physical Therapy: Exactly when you start therapy and what you will be allowed to do at therapy will depend on how extensive your surgery was. We will go over all of this with you after surgery. In many cases, physical therapy is delayed until after the first postoperative visit with Dr. Jones.   Exercise your hand, wrist, and elbow 3 times a day as well, 10 reps each. Open your sling and flex/extend your elbow and wrist to avoid stiffness. Take care that these movements do not involve the shoulder.   Daily Activities: NO active flexion or elevation of your shoulder until given the OK by Dr. Jones.   Athletic Activities: Activities such as swimming, biking, jogging, etc. should be avoided until allowed by Dr. Jones.    C. Wound Care  You many shower with the bandage on. Attempt to keep the area of surgery away from water, however.   Remove your dressing 3 days after surgery. No  additional dressing is necessary.   Do not soak in a bathtub, just shower and pat the wounds dry.    D. Diet  The first few meals after surgery should be light, easily digestible foods and plenty of liquids, as some people experience slight nausea as a temporary reaction to anesthesia.     E. Call your physician if:  Pain in your shoulder persists or worsens after the first 48-72 hours.  Excessive redness or drainage of cloudy or bloody material presents around the incisions.  Your temperature is greater than 101.5 degrees for more than 48 hours after surgery.   You have significant pain or redness in your hand or arm.  You have numbness or weakness in your arm or hand that persists more than 48 hours following the surgery.    F. Follow-Up  In most cases, follow-up was already arranged at the time surgery was scheduled. If not, contact the office today or tomorrow to arrange follow-up.[facility]  (in partnership with [facility])  Dr. Koffi Jones  [place] Office: [address] ([facility]), [place] [address] (Phone: [phone])  [place] Office: [address] ([facility]), [place] [address] (Phone: [phone])  [email or web address]    Postoperative Instructions for Arthroscopic Shoulder Surgery  Your recovery after shoulder surgery entails controlling swelling and discomfort and maintaining elbow, wrist, and hand function. The following instructions are intended as a guide to help you achieve these goals until your first postoperative visit. However, no patient or surgery is the same, thus these are general guidelines and  will tell you specifics related to your surgery.    A. Comfort  Although arthroscopy uses only a few tiny incisions around the shoulder joint, swelling and discomfort can be present. To minimize discomfort, please do the following:  Ice: Ice can help control  swelling and discomfort. Place crushed ice (or a frozen bag of peas) in a plastic bag, then wrap the bag with a small towel to protect your skin. Place the ice over your shoulder for no more than 30 minutes each hour.  Pain Medication: You will be given a prescription for pain medication, but only take it as necessary. Avoid any NSAIDS (Motrin, Advil, Aleve, Ibuprofen) until approved by Dr. Jones. Avoid alcohol if you are taking pain medication.   The local anesthetic from a nerve block may keep your entire arm numb for many hours. After the nerve block resolves, the pain will require prescription medication. Many patients find that lying down adds to their discomfort. Sleeping in a recliner, or propped up in bed often works best. A pillow placed behind your elbow may also help.   Sling: A sling has been provided for your comfort. Use the sling until directed to stop using by Dr. Jones.       BAsael Activities  Sling Use: You will be immobilized with a sling for approximately 4-6 weeks, depending on your surgery. Dr. Jones will let you know how long it will be during your first postoperative visit. You may gently remove the sling for dressing, hygiene, and elbow/wrist/hand exercises.   Physical Therapy: Exactly when you start therapy and what you will be allowed to do at therapy will depend on how extensive your surgery was. We will go over all of this with you after surgery. In many cases, physical therapy is delayed until after the first postoperative visit with Dr. Jones.   Exercise your hand, wrist, and elbow 3 times a day as well, 10 reps each. Open your sling and flex/extend your elbow and wrist to avoid stiffness. Take care that these movements do not involve the shoulder.   Daily Activities: NO active flexion or elevation of your shoulder until given the OK by Dr. Jones.   Athletic Activities: Activities such as swimming, biking, jogging, etc. should be avoided until allowed by Dr. Jones.    SANGE[facility] Wound  Care  You many shower with the bandage on. Attempt to keep the area of surgery away from water, however.   Remove your dressing 3 days after surgery. No additional dressing is necessary.   Do not soak in a bathtub, just shower and pat the wounds dry.    D. Diet  The first few meals after surgery should be light, easily digestible foods and plenty of liquids, as some people experience slight nausea as a temporary reaction to anesthesia.     E. Call your physician if:  Pain in your shoulder persists or worsens after the first 48-72 hours.  Excessive redness or drainage of cloudy or bloody material presents around the incisions.  Your temperature is greater than 101.5 degrees for more than 48 hours after surgery.   You have significant pain or redness in your hand or arm.  You have numbness or weakness in your arm or hand that persists more than 48 hours following the surgery.    F. Follow-Up  In most cases, follow-up was already arranged at the time surgery was scheduled. If not, contact the office today or tomorrow to arrange follow-up.

## 2024-09-04 NOTE — ANESTHESIA PROCEDURE NOTES
Airway  Date/Time: 9/4/2024 8:05 AM  Urgency: elective      Staffing  Performed: CRNA   Authorized by: Jesus Zuñiga DO    Performed by: SABINO Jean-CRNA  Patient location during procedure: OR    Indications and Patient Condition  Indications for airway management: anesthesia  Spontaneous Ventilation: absent  Sedation level: deep  Preoxygenated: yes  Patient position: sniffing  Mask difficulty assessment: 1 - vent by mask    Final Airway Details  Final airway type: endotracheal airway      Successful airway: ETT  Cuffed: yes   Successful intubation technique: direct laryngoscopy  Facilitating devices/methods: intubating stylet  Blade: Jostin  Blade size: #4  ETT size (mm): 7.5  Cormack-Lehane Classification: grade I - full view of glottis  Placement verified by: chest auscultation and capnometry   Measured from: lips  ETT to lips (cm): 22  Number of attempts at approach: 1

## 2024-09-04 NOTE — ANESTHESIA PREPROCEDURE EVALUATION
Patient: Brady Mullins    Procedure Information       Date/Time: 09/04/24 0745    Procedure: Repair Arthroscopy Rotator Cuff Shoulder (Left: Shoulder)    Location: [facility] / [facility]    Surgeons: Ashu Oliver MD            Relevant Problems   Anesthesia (within normal limits)      Cardiac   (+) HTN (hypertension)   (+) Hyperlipidemia      /Renal   (+) BPH (benign prostatic hyperplasia)       Clinical information reviewed:    Allergies  Meds               NPO Detail:  No data recorded     Physical Exam    Airway  Mallampati: II  TM distance: >3 FB  Neck ROM: full     Cardiovascular    Dental    Pulmonary    Abdominal            Anesthesia Plan    History of general anesthesia?: yes  History of complications of general anesthesia?: no    ASA 2     general and regional     The patient is not a current smoker.  Patient was not previously instructed to abstain from smoking on day of procedure.  Patient did not smoke on day of procedure.  Education provided regarding risk of obstructive sleep apnea.  intravenous induction   Postoperative administration of opioids is intended.  Anesthetic plan and risks discussed with patient.  Use of blood products discussed with patient who consented to blood products.    Plan discussed with CRNA and CAA.

## (undated) DEVICE — SYRINGE, 30 CC, LUER LOCK

## (undated) DEVICE — FIBERTAPE, 2MM X 7, FIBERWIRE, BLUE

## (undated) DEVICE — SOLUTION, IRRIGATION, USP, SODIUM CHLORIDE 0.9%, 3000 ML, BAG

## (undated) DEVICE — NEEDLE, SCORPION, HD

## (undated) DEVICE — Device

## (undated) DEVICE — POSITIONER KIT, SCHLEIN, MULTIFLEX PAD

## (undated) DEVICE — BLADE, DOUBLECUT, 4.0MM X 13CM

## (undated) DEVICE — GLOVE, SURGICAL, PROTEXIS PI , 7.5, PF, LF

## (undated) DEVICE — IMMOBILIZER, ULTRASLING III, LARGE

## (undated) DEVICE — TUBING, SUCTION, 6MM X 10, CLEAN N-COND

## (undated) DEVICE — TUBING, PUMP MAIN 16FT STERILE

## (undated) DEVICE — ADHESIVE, SKIN, DERMABOND ADVANCED, 15CM, PEN-STYLE

## (undated) DEVICE — SYRINGE, 10 CC, LUER LOCK

## (undated) DEVICE — PROBE, APOLLO RF, 90 DEG, EXTRA LARTGE

## (undated) DEVICE — GLOVE, SURGICAL, PROTEXIS PI BLUE W/NEUTHERA, 7.5, PF, LF

## (undated) DEVICE — NEEDLE, HYPODERMIC, PROEDGE, 22G X 1.5, BLACK

## (undated) DEVICE — CANNULA, BUTTON, PASSPORT, 8MM X 3CM

## (undated) DEVICE — CANNULA, ARTHROSCOPIC TWIST-IN 7MM